# Patient Record
Sex: FEMALE | Race: BLACK OR AFRICAN AMERICAN | URBAN - METROPOLITAN AREA
[De-identification: names, ages, dates, MRNs, and addresses within clinical notes are randomized per-mention and may not be internally consistent; named-entity substitution may affect disease eponyms.]

---

## 2017-05-07 ENCOUNTER — HOSPITAL ENCOUNTER (EMERGENCY)
Facility: CLINIC | Age: 42
Discharge: HOME | End: 2017-05-07
Attending: EMERGENCY MEDICINE | Admitting: EMERGENCY MEDICINE
Payer: COMMERCIAL

## 2017-05-07 ENCOUNTER — APPOINTMENT (OUTPATIENT)
Dept: ULTRASOUND IMAGING | Facility: CLINIC | Age: 42
End: 2017-05-07
Attending: EMERGENCY MEDICINE
Payer: COMMERCIAL

## 2017-05-07 VITALS
SYSTOLIC BLOOD PRESSURE: 114 MMHG | DIASTOLIC BLOOD PRESSURE: 82 MMHG | TEMPERATURE: 97.8 F | HEART RATE: 68 BPM | OXYGEN SATURATION: 100 % | RESPIRATION RATE: 14 BRPM

## 2017-05-07 DIAGNOSIS — O03.9 FETAL DEMISE DUE TO MISCARRIAGE: ICD-10-CM

## 2017-05-07 LAB
ABO + RH BLD: NORMAL
ABO + RH BLD: NORMAL
B-HCG SERPL-ACNC: 6724 IU/L (ref 0–5)
HGB BLD-MCNC: 11.8 G/DL (ref 11.7–15.7)
SPECIMEN EXP DATE BLD: NORMAL

## 2017-05-07 PROCEDURE — 84702 CHORIONIC GONADOTROPIN TEST: CPT | Performed by: EMERGENCY MEDICINE

## 2017-05-07 PROCEDURE — 36415 COLL VENOUS BLD VENIPUNCTURE: CPT | Performed by: EMERGENCY MEDICINE

## 2017-05-07 PROCEDURE — 87591 N.GONORRHOEAE DNA AMP PROB: CPT | Performed by: EMERGENCY MEDICINE

## 2017-05-07 PROCEDURE — 87491 CHLMYD TRACH DNA AMP PROBE: CPT | Performed by: EMERGENCY MEDICINE

## 2017-05-07 PROCEDURE — 86901 BLOOD TYPING SEROLOGIC RH(D): CPT | Performed by: EMERGENCY MEDICINE

## 2017-05-07 PROCEDURE — 25000132 ZZH RX MED GY IP 250 OP 250 PS 637: Performed by: EMERGENCY MEDICINE

## 2017-05-07 PROCEDURE — 85018 HEMOGLOBIN: CPT | Performed by: EMERGENCY MEDICINE

## 2017-05-07 PROCEDURE — 99284 EMERGENCY DEPT VISIT MOD MDM: CPT

## 2017-05-07 PROCEDURE — 76801 OB US < 14 WKS SINGLE FETUS: CPT

## 2017-05-07 RX ORDER — IBUPROFEN 600 MG/1
600 TABLET, FILM COATED ORAL EVERY 6 HOURS PRN
Qty: 30 TABLET | Refills: 1 | Status: SHIPPED | OUTPATIENT
Start: 2017-05-07

## 2017-05-07 RX ORDER — HYDROCODONE BITARTRATE AND ACETAMINOPHEN 5; 325 MG/1; MG/1
1-2 TABLET ORAL EVERY 4 HOURS PRN
Qty: 20 TABLET | Refills: 0 | Status: SHIPPED | OUTPATIENT
Start: 2017-05-07

## 2017-05-07 RX ORDER — ACETAMINOPHEN 500 MG
500 TABLET ORAL EVERY 4 HOURS PRN
Status: DISCONTINUED | OUTPATIENT
Start: 2017-05-07 | End: 2017-05-07 | Stop reason: HOSPADM

## 2017-05-07 RX ADMIN — ACETAMINOPHEN 500 MG: 500 TABLET, FILM COATED ORAL at 18:24

## 2017-05-07 NOTE — ED PROVIDER NOTES
"  History     Chief Complaint:  Vaginal Bleeding    HPI   Alber Pacheco is a 41 year old female A2 who presents to the emergency department today for evaluation of vaginal bleeding. The patient is about 11 weeks pregnant and started having the vaginal bleeding yesterday with lower pelvic abdominal pain. She is having an associated headaches but no vomiting or diarrhea. In her past pregnancies she has had eclampsia.  Cramping but \"not as severe\" as past miscarriage.  Pain is now 3/10.  Not passing clots or tissue.    Allergies:  No Known Drug Allergies      Medications:    Ibuprofen    Past Medical History:    Arthritis   Pre eclampsia      Past Surgical History:    C section  Cholecystectomy     Family History:    Mother-hypertension, thyroid disease    Social History:  Smoking Status: never smoker  Alcohol Use: negative    Marital Status:   [2]    Review of Systems  All other ROS reviewed and negative.  Pt is 11 weeks pregnant and started having vaginal bleeding yesterday. Denies clots or need for a pad.    Physical Exam   Vitals:  Patient Vitals for the past 24 hrs:   BP Temp Temp src Pulse Resp SpO2   17 1536 117/72 97.8  F (36.6  C) Oral 67 14 100 %      Physical Exam  GEN: patient with no distress  HEAD: atraumatic, normocephalic  EYES: pupils reactive,  conjunctivae normal  ENT: TMs flat and white bilaterally, oropharynx normal with no erythema or exudate, mucus membranes moist  NECK: no cervical LAD  RESPIRATORY: no tachypnea, breath sounds clear to auscultation  CVS: normal S1/S2, no murmurs/rubs/gallops  ABDOMEN: soft, nontender, no masses or organomegaly, no rebound, positive bowel sounds  BACK: no CVAT  EXTREMITIES: intact pulses x 2 (radial pulses intact), no edema  SKIN: warm and dry  NEURO: Overall symmetrical exam  HEME: no bruising or petechiae/contusions  LYMPH: no lymphadenopathy  : normal female external genitalia, no adnexal masses, no vaginal discharge but slight old blood and " cervical bleeding, cervix is closed    Emergency Department Course       Imaging:  Radiology findings were communicated with the patient who voiced understanding of the findings.  US OB:   FINDINGS: There is an intrauterine pregnancy with crown-rump length of  1.7 cm consistent with 8 weeks 1 day mean gestational age. However  there is no heart motion indicating fetal demise. Gestational sac  appears normal. No evidence of hemorrhage.    Right ovary: Unremarkable.  Left ovary: Corpus luteum pregnancy about 1.2 cm diameter.  Adnexal mass: None.  Free pelvic fluid: None.             Fetal demise at 8 weeks 1 day gestational age.  Reading per radiology.     Laboratory:  HCG Quantitative: 6724 (H)  RH Type: O Positive  Hemoglobin: 11.8   Neisseria gonorrhea: Pending  Chlamydia trachomatis: Pending    Interventions:  Tylenol 500mg PO    Emergency Department Course:  Nursing notes and vitals reviewed.  I performed an exam of the patient as documented above.   IV was inserted and blood was drawn for laboratory testing, results above.  The patient was sent for a OB ultrasound while in the emergency department, results above.      I personally reviewed the laboratory and imaging results with the patient and answered all related questions prior to discharge.    7:56 PM Patient updated regarding results.  /82  Pulse 68  Temp 97.8  F (36.6  C) (Oral)  Resp 14  LMP 02/15/2017  SpO2 100%  Breastfeeding? No    Discussed results with patient.  Gave patient copies of results (applicable labs, CT scans and/or ultrasound).  Answered questions.  Asked patient to followup with PCP.    Impression & Plan      Medical Decision Making:  See dictation    Diagnosis:  Fetal demise  Miscarriage in process    Disposition:   Home    Instructions to patient:  The bloodwork and ultrasound show a baby is present, however the heartbeat of the baby is not detectable.  This means that you are undergoing a miscarriage.  You can expect more  bleeding and cramping as the uterine contents empty out.    Hydrate and push fluids.  Followup for a recheck with OBGYN in the next 2-3 days.    Motrin/ibuprofen for mild pain.    .orco for more severe pain.    Scribe Disclosure:  I, Martin Donte, am serving as a scribe at 4:09 PM on 5/7/2017 to document services personally performed by Eufemia Loving MD, based on my observations and the provider's statements to me.   5/7/2017   [facility] EMERGENCY DEPARTMENT       Eufemia Loving MD  05/07/17 5976

## 2017-05-07 NOTE — ED NOTES
In Triage: ABC's intact and interacting appropriately for situation.    Home meds: calcium    Pt is 11 weeks pregnant and started having vaginal bleeding yesterday. Denies clots or need for a pad.

## 2017-05-07 NOTE — ED AVS SNAPSHOT
[facility] Emergency Department    [address] [place] [address]    Phone:  [phone]    Fax:  [phone]                                       Alber Pacheco   MRN: [id number]    Department:  [facility] Emergency Department   Date of Visit:  5/7/2017           Patient Information     Date Of Birth          1975        Your diagnoses for this visit were:     Fetal demise due to miscarriage        You were seen by Eufemia Loving MD.      Follow-up Information     Follow up with [facility], [place], MD.    Specialty:  Family Practice    Contact information:    [facility] [facility]  [place] [address]  [phone]          Discharge Instructions         The bloodwork and ultrasound show a baby is present, however the heartbeat of the baby is not detectable.  This means that you are undergoing a miscarriage.  You can expect more bleeding and cramping as the uterine contents empty out.    Hydrate and push fluids.  Followup for a recheck with OBGYN in the next 2-3 days.    Motrin/ibuprofen for mild pain.    .Norco for more severe pain.    Discharge Instructions for Miscarriage  You have had a miscarriage. This is the unplanned end of a pregnancy before the baby is able to live outside the womb. You may have experienced a shock to your system, both physically and emotionally. Because of this, you may not feel well for a few days. Your body is going through changes, and you can expect mood swings. When you are ready, start back to your normal routine.  Home care    Return to work or your daily routines when you feel ready. This might be right away, or you may want to wait a few days.    Take showers instead of tub baths. This helps prevent infection. Ask your health care provider when you can take baths again.    Avoid strenuous exercise, such as aerobics or running, until the bleeding slows to the rate of a normal period.    Don t have sexual intercourse  or use tampons or douches until your doctor says it s OK.    Get emotional support. Ask your doctor about support groups in your area. Many women find it helpful to talk to other women who have had a miscarriage.  Follow-up  Make a follow-up appointment with your health care provider.  When to call your health care provider  Call your health care provider right away if you have any of the following:    Fever above 100.4 F (38 C) or chills    Bright red vaginal bleeding or a smelly discharge    Vaginal bleeding that soaks more than 1 menstrual pad per hour    Abdominal pain that is severe or getting worse     8445-9646 [employer] [address] [place] [address]. All rights reserved. This information is not intended as a substitute for professional medical care. Always follow your healthcare professional's instructions.          24 Hour Appointment Hotline       To make an appointment at any [facility], call [phone] ([phone]). If you don't have a family doctor or clinic, we will help you find one. [facility] clinics are conveniently located to serve the needs of you and your family.             Review of your medicines      START taking        Dose / Directions Last dose taken    HYDROcodone-acetaminophen 5-325 MG per tablet   Commonly known as:  NORCO   Dose:  1-2 tablet   Quantity:  20 tablet        Take 1-2 tablets by mouth every 4 hours as needed for moderate to severe pain   Refills:  0          Our records show that you are taking the medicines listed below. If these are incorrect, please call your family doctor or clinic.        Dose / Directions Last dose taken    Calcium Carb-Cholecalciferol 1000-800 MG-UNIT Tabs   Commonly known as:  CALCIUM 1000 + D   Dose:  1 tablet   Quantity:  100 tablet        Take 1 tablet by mouth daily TAKE WITH FOOD, FOR BONE HEALTH AND LOW VITAMIN D ([facility], [facility])   Refills:  PRN        Medical Compression Stockings Misc         Refills:  0          ASK your doctor about these medications        Dose / Directions Last dose taken    * IBUPROFEN PO   Dose:  1 tablet   What changed:  Another medication with the same name was added. Make sure you understand how and when to take each.   Ask about: Which instructions should I use?        Take 1 tablet by mouth as needed for moderate pain   Refills:  0        * ibuprofen 600 MG tablet   Commonly known as:  ADVIL/MOTRIN   Dose:  600 mg   What changed:  You were already taking a medication with the same name, and this prescription was added. Make sure you understand how and when to take each.   Quantity:  30 tablet   Ask about: Which instructions should I use?        Take 1 tablet (600 mg) by mouth every 6 hours as needed for moderate pain   Refills:  1        * Notice:  This list has 2 medication(s) that are the same as other medications prescribed for you. Read the directions carefully, and ask your doctor or other care provider to review them with you.            Prescriptions were sent or printed at these locations (2 Prescriptions)                   Other Prescriptions                Printed at Department/Unit printer (2 of 2)         HYDROcodone-acetaminophen (NORCO) 5-325 MG per tablet               ibuprofen (ADVIL/MOTRIN) 600 MG tablet                Procedures and tests performed during your visit     Chlamydia trachomatis PCR    HCG quantitative pregnancy    Hemoglobin    Neisseria gonorrhoeae PCR    Rh type    US OB < 14 Weeks w Transvaginal      Orders Needing Specimen Collection     None      Pending Results     Date and Time Order Name Status Description    5/7/2017 1623 Chlamydia trachomatis PCR In process     5/7/2017 1623 Neisseria gonorrhoeae PCR In process             Pending Culture Results     Date and Time Order Name Status Description    5/7/2017 1623 Chlamydia trachomatis PCR In process     5/7/2017 1623 Neisseria gonorrhoeae PCR In process             Pending Results  Instructions     If you had any lab results that were not finalized at the time of your Discharge, you can call the ED Lab Result RN at [phone]. You will be contacted by this team for any positive Lab results or changes in treatment. The nurses are available 7 days a week from 10A to 6:30P.  You can leave a message 24 hours per day and they will return your call.        Test Results From Your Hospital Stay        5/7/2017  5:27 PM      Component Results     Component Value Ref Range & Units Status    HCG Quantitative Serum 6724 (H) 0 - 5 IU/L Final    Specimen run with a dilution         5/7/2017  5:14 PM      Component Results     Component    ABO    O    RH(D)     Pos    Specimen Expires    05/10/2017         5/7/2017  5:09 PM      Component Results     Component Value Ref Range & Units Status    Hemoglobin 11.8 11.7 - 15.7 g/dL Final         5/7/2017  5:11 PM         5/7/2017  5:11 PM         5/7/2017  7:58 PM      Narrative     OBSTETRIC ULTRASOUND WITH ENDOVAGINAL TRANSDUCER    5/7/2017 7:06 PM     HISTORY: Bleeding and cramping.    TECHNIQUE:  Endovaginal sonography was added to the transabdominal  exam to better evaluate the uterus and ovaries because of inadequate  bladder fullness.    COMPARISON: None.    FINDINGS: There is an intrauterine pregnancy with crown-rump length of  1.7 cm consistent with 8 weeks 1 day mean gestational age. However  there is no heart motion indicating fetal demise. Gestational sac  appears normal. No evidence of hemorrhage.    Right ovary: Unremarkable.  Left ovary: Corpus luteum pregnancy about 1.2 cm diameter.  Adnexal mass: None.  Free pelvic fluid: None.        Impression     IMPRESSION: Fetal demise at 8 weeks 1 day gestational age.      MAEVE RODRIGUEZ MD                Clinical Quality Measure: Blood Pressure Screening     Your blood pressure was checked while you were in the emergency department today. The last reading we obtained was  BP: 117/72 . Please read the  "guidelines below about what these numbers mean and what you should do about them.  If your systolic blood pressure (the top number) is less than 120 and your diastolic blood pressure (the bottom number) is less than 80, then your blood pressure is normal. There is nothing more that you need to do about it.  If your systolic blood pressure (the top number) is 120-139 or your diastolic blood pressure (the bottom number) is 80-89, your blood pressure may be higher than it should be. You should have your blood pressure rechecked within a year by a primary care provider.  If your systolic blood pressure (the top number) is 140 or greater or your diastolic blood pressure (the bottom number) is 90 or greater, you may have high blood pressure. High blood pressure is treatable, but if left untreated over time it can put you at risk for heart attack, stroke, or kidney failure. You should have your blood pressure rechecked by a primary care provider within the next 4 weeks.  If your provider in the emergency department today gave you specific instructions to follow-up with your doctor or provider even sooner than that, you should follow that instruction and not wait for up to 4 weeks for your follow-up visit.        Thank you for choosing [facility]       Thank you for choosing [facility] for your care. Our goal is always to provide you with excellent care. Hearing back from our patients is one way we can continue to improve our services. Please take a few minutes to complete the written survey that you may receive in the mail after you visit with us. Thank you!        [employer] Information     [employer] lets you send messages to your doctor, view your test results, renew your prescriptions, schedule appointments and more. To sign up, go to [email or web address] . Click on \"Log in\" on the left side of the screen, which will take you to the Welcome page. Then click on \"Sign up Now\" on the right side of the page.     You will be " asked to enter the access code listed below, as well as some personal information. Please follow the directions to create your username and password.     Your access code is: [email or web address]  Expires: 2017  7:57 PM     Your access code will  in 90 days. If you need help or a new code, please call your [facility] clinic or [phone].        Care EveryWhere ID     This is your Care EveryWhere ID. This could be used by other organizations to access your [facility] medical records  [id number]        After Visit Summary       This is your record. Keep this with you and show to your community pharmacist(s) and doctor(s) at your next visit.

## 2017-05-07 NOTE — ED AVS SNAPSHOT
[facility] Emergency Department    [address]    [facility] [address]    Phone:  [phone]    Fax:  [phone]                                       Alber Pacheco   MRN: [id number]    Department:  [facility] Emergency Department   Date of Visit:  5/7/2017           After Visit Summary Signature Page     I have received my discharge instructions, and my questions have been answered. I have discussed any challenges I see with this plan with the nurse or doctor.    ..........................................................................................................................................  Patient/Patient Representative Signature      ..........................................................................................................................................  Patient Representative Print Name and Relationship to Patient    ..................................................               ................................................  Date                                            Time    ..........................................................................................................................................  Reviewed by Signature/Title    ...................................................              ..............................................  Date                                                            Time

## 2017-05-08 LAB
C TRACH DNA SPEC QL NAA+PROBE: NORMAL
N GONORRHOEA DNA SPEC QL NAA+PROBE: NORMAL
SPECIMEN SOURCE: NORMAL
SPECIMEN SOURCE: NORMAL

## 2017-05-08 NOTE — DISCHARGE INSTRUCTIONS
The bloodwork and ultrasound show a baby is present, however the heartbeat of the baby is not detectable.  This means that you are undergoing a miscarriage.  You can expect more bleeding and cramping as the uterine contents empty out.    Hydrate and push fluids.  Followup for a recheck with OBGYN in the next 2-3 days.    Motrin/ibuprofen for mild pain.    .Norco for more severe pain.    Discharge Instructions for Miscarriage  You have had a miscarriage. This is the unplanned end of a pregnancy before the baby is able to live outside the womb. You may have experienced a shock to your system, both physically and emotionally. Because of this, you may not feel well for a few days. Your body is going through changes, and you can expect mood swings. When you are ready, start back to your normal routine.  Home care    Return to work or your daily routines when you feel ready. This might be right away, or you may want to wait a few days.    Take showers instead of tub baths. This helps prevent infection. Ask your health care provider when you can take baths again.    Avoid strenuous exercise, such as aerobics or running, until the bleeding slows to the rate of a normal period.    Don t have sexual intercourse or use tampons or douches until your doctor says it s OK.    Get emotional support. Ask your doctor about support groups in your area. Many women find it helpful to talk to other women who have had a miscarriage.  Follow-up  Make a follow-up appointment with your health care provider.  When to call your health care provider  Call your health care provider right away if you have any of the following:    Fever above 100.4 F (38 C) or chills    Bright red vaginal bleeding or a smelly discharge    Vaginal bleeding that soaks more than 1 menstrual pad per hour    Abdominal pain that is severe or getting worse     [id number] [employer]. [address] [place] [address], [place] [address]. All rights reserved. This  information is not intended as a substitute for professional medical care. Always follow your healthcare professional's instructions.

## 2021-09-03 ENCOUNTER — LAB (OUTPATIENT)
Dept: LAB | Facility: CLINIC | Age: 46
End: 2021-09-03
Payer: COMMERCIAL

## 2021-09-03 ENCOUNTER — OFFICE VISIT (OUTPATIENT)
Dept: OBGYN | Facility: CLINIC | Age: 46
End: 2021-09-03
Payer: COMMERCIAL

## 2021-09-03 VITALS
BODY MASS INDEX: 29.57 KG/M2 | SYSTOLIC BLOOD PRESSURE: 110 MMHG | WEIGHT: 184 LBS | DIASTOLIC BLOOD PRESSURE: 74 MMHG | HEIGHT: 66 IN | HEART RATE: 59 BPM

## 2021-09-03 DIAGNOSIS — N92.6 ABNORMAL BLEEDING IN MENSTRUAL CYCLE: Primary | ICD-10-CM

## 2021-09-03 DIAGNOSIS — N92.6 ABNORMAL BLEEDING IN MENSTRUAL CYCLE: ICD-10-CM

## 2021-09-03 LAB
HGB BLD-MCNC: 11.7 G/DL (ref 11.7–15.7)
PROLACTIN SERPL-MCNC: 5 UG/L (ref 3–27)
TSH SERPL DL<=0.005 MIU/L-ACNC: 1.01 MU/L (ref 0.4–4)

## 2021-09-03 PROCEDURE — 84146 ASSAY OF PROLACTIN: CPT

## 2021-09-03 PROCEDURE — 84443 ASSAY THYROID STIM HORMONE: CPT

## 2021-09-03 PROCEDURE — 85018 HEMOGLOBIN: CPT

## 2021-09-03 PROCEDURE — 99203 OFFICE O/P NEW LOW 30 MIN: CPT | Mod: GE | Performed by: OBSTETRICS & GYNECOLOGY

## 2021-09-03 PROCEDURE — 36415 COLL VENOUS BLD VENIPUNCTURE: CPT

## 2021-09-03 PROCEDURE — G0463 HOSPITAL OUTPT CLINIC VISIT: HCPCS

## 2021-09-03 ASSESSMENT — MIFFLIN-ST. JEOR: SCORE: 1496.37

## 2021-09-03 ASSESSMENT — PAIN SCALES - GENERAL: PAINLEVEL: NO PAIN (0)

## 2021-09-03 NOTE — PROGRESS NOTES
"Gynecology Consult Note    Referring Physician: patient  Reason for Consult: fibroids    HPI: Alber Pacheco is a 45 year old  referred to clinic for uterine fibroids. Patient has concerns about her irregular periods. For the last year she has had either two periods per month, lasting 6 days with lots of heavy bleeding or she skips every 2-3 months. She had been seen by a OB provider and was told she had a fibroid.   She occasionally feels lightheaded. She is also interested in fertiliy. She has had two prior miscarriages , . She has regular intercourse  She denies heat/cold intolerance, frequent headaches, abnormal hair growth or acne, weight changes.     GynHx:   Last pap- unkown, normal  Abnormal paps- none  STIs- none  Contraception Hx: None    PMH:   Past Medical History:   Diagnosis Date     Arthritis        PSH:   Past Surgical History:   Procedure Laterality Date     C/SECTION, LOW TRANSVERSE       CHOLECYSTECTOMY, LAPOROSCOPIC         Social Hx:   Social History     Tobacco Use     Smoking status: Never Smoker     Smokeless tobacco: Never Used   Substance Use Topics     Alcohol use: No     Drug use: No        Family History: family history includes Hypertension in her mother; Thyroid Disease in her mother.  No family history of breast, ovarian or uterine cancer. No history of DVT or migranes.    ROS:   Negative except per HPI    Objective:   /74   Pulse 59   Ht 1.676 m (5' 6\")   Wt 83.5 kg (184 lb)   LMP 2021   Breastfeeding No   BMI 29.70 kg/m    Constitutional: Healthy appearing  female, no acute distress  HEENT: Normal appearance.  Neck supple, thyroid normal in size without nodularity or masses.  Cardiovascular: Regular rate and rhythm   Respiratory: Breathing easily on RA  Skin: No suspicious lesions or rashes, no hyperpigmentation or acne  Psychiatric: mentation appears normal and affect normal/bright    Labs/Imaging:  Pelvic US 21  The uterus is present, " retroverted, without deviation, and measures 6.9 x   5.1 x 4.3 cm.  The myometrium is heterogeneous.  The endometrial thickness is 10.0 mm. Endometrium is heterogenous and   ill-defined.  One submucosal myoma is located in the posterior and lower segment of the   uterus. It measures 1.1 x 1.1 x 0.9 cm and does appear to encroach upon   but not distort the endometrial lining.  The right ovary measures 3.2 X 2.0 X 1.0 cm and is normal in appearance.  The left ovary measures 3.0 X 1.6 X 1.5 cm and is normal in appearance.   Dominant follicle noted.  Free fluid in the cul de sac: None    Assessment/Plan:   Alber Pacheco is a 45 year old  with AUB of unknown origin. Reviewed today that there are lots of reasons for abnormal bleeding including hormonal and structural causes. Reviewed thyroid, prolactinoma, perimenopause, uterine polyp and fibroid. On her most recent US 4 months ago her EMS was irregular concerning for possible polyp. There was also a small fibroid but as it is 1 cm, do not expect that this is the cause of her heavy, irregular bleeding. Reviewed that polyp may be a partial cause but will also perform  TSH and prolactin today to rule out other causes. No evidence of PCOS on previous US or findings of hyperandrogenism. She is interested in a repeat US to determine if the polyp is still present. Reviewed that treatment for polyp would be hysteroscopy with polypectomy. If polyp is not present would still recommend endometrial sampling to rule out endometrial hyperplasia/malignancy.   Also briefly reviewed with patient that with her age there is likely a component of perimenopause as well. As she has not conceived in > 5 years despite trying she meets the criteria for infertility. Reviewed process of decrease egg quality and quantity with age. Would still recommend removing polyp if present if she wants to continue to pursue fertility. Recommended follow up for further discussion of infertility after  resolution of AUB work up.  - Labs: TSH, prolactin, Hg  - Next visit for US followed by MD visit to discuss findings/plan      Discussed with Dr. Donna Toth MD  Obstetrics & Gynecology, PGY-4  9/3/2021 5:05 PM      Patient was seen by the resident in [facility].  I reviewed the history & exam.  The patient's assessment and plan were made jointly.    Kelly Thompson MD MPH

## 2021-09-03 NOTE — NURSING NOTE
Chief Complaint   Patient presents with     Establish Care     Fibroid consult   Shereen Viramontes LPN

## 2021-09-13 ENCOUNTER — ANCILLARY PROCEDURE (OUTPATIENT)
Dept: ULTRASOUND IMAGING | Facility: CLINIC | Age: 46
End: 2021-09-13
Attending: OBSTETRICS & GYNECOLOGY
Payer: COMMERCIAL

## 2021-09-13 DIAGNOSIS — N92.6 ABNORMAL BLEEDING IN MENSTRUAL CYCLE: ICD-10-CM

## 2021-09-13 PROCEDURE — 76830 TRANSVAGINAL US NON-OB: CPT | Mod: 26 | Performed by: OBSTETRICS & GYNECOLOGY

## 2021-09-13 PROCEDURE — 76830 TRANSVAGINAL US NON-OB: CPT

## 2021-09-30 ENCOUNTER — OFFICE VISIT (OUTPATIENT)
Dept: OBGYN | Facility: CLINIC | Age: 46
End: 2021-09-30
Attending: OBSTETRICS & GYNECOLOGY
Payer: COMMERCIAL

## 2021-09-30 VITALS
HEIGHT: 66 IN | DIASTOLIC BLOOD PRESSURE: 79 MMHG | WEIGHT: 185.9 LBS | HEART RATE: 64 BPM | BODY MASS INDEX: 29.88 KG/M2 | SYSTOLIC BLOOD PRESSURE: 115 MMHG

## 2021-09-30 DIAGNOSIS — Z31.41 FERTILITY TESTING: ICD-10-CM

## 2021-09-30 DIAGNOSIS — N93.9 ABNORMAL UTERINE BLEEDING (AUB): Primary | ICD-10-CM

## 2021-09-30 PROCEDURE — 99212 OFFICE O/P EST SF 10 MIN: CPT | Performed by: OBSTETRICS & GYNECOLOGY

## 2021-09-30 PROCEDURE — G0463 HOSPITAL OUTPT CLINIC VISIT: HCPCS

## 2021-09-30 ASSESSMENT — MIFFLIN-ST. JEOR: SCORE: 1504.99

## 2021-09-30 NOTE — PROGRESS NOTES
"S:  Pt is a 44 y/o  who presents today to follow up perimenopausal irregular bleeding and secondary infertility.  She was seen by Dr. Toth on 9/3 with similar concerns.  She had an ultrasound in 4/21 that was concerning for a possible endometrial polyp and a follow up study was ordered.  Today is asks about the fibroid that was seen previously and notes prior evaluation through an [facility] (a procedure that \"flushed the uterus\") as well as a prior 3 month trial of OCPs.  She notes normal withdrawal bleeds on OCPs but that her irregular bleeding returned after she stopped them.     O: /79 (BP Location: Left arm, Patient Position: Chair)   Pulse 64   Ht 1.676 m (5' 6\")   Wt 84.3 kg (185 lb 14.4 oz)   LMP 09/02/2021   BMI 30.01 kg/m    gen-pleasant, NAD    Ultrasound today-  45 year old female presents for gynecologic ultrasound indicated by Abnormal bleeding in menstrual cycle.  This study was done transvaginally.     Uterine findings:              Presence: Visible Size: Normal 7.0 x 4.8 x 4.2 cm.  Endometrium = 0.9 mm.              Cx length = 37.7 mm.                            Flexion:  Retroverted  Position: Midline          Margins: Smooth         Shape: Normal              Contour: Regular        Texture: bulky posterior uterus           Cavity: Normal            Masses: Normal     Pelvic findings:               Right Adnexa: Normal              Left Adnexa: Normal              Bladder:  Normal                                                           Cul - de - sac fluid: none     Ovarian follicles:              Right ovary:  3.3 x 5.2 x 1.0 cm.                 Left ovary:  3.1 x 2.2 x 1.0 cm.        Comments:  Normal appearing uterus and endometrial stripe.  Bulky area noted in posterior uterus of uncertain etiology.  Further studies as clinically indicated.      DAVE Elias MD, FACOG        Chart reviewed-HSG in 5/20 with possible 8mm endometrial polyp     " CT venogram of pelvis 9/20 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  She has a known small fibroid that does not impinge on the endometrium, normal appearing endometrial stripe today without evidence of polyp.      P:  Reviewed her results today-she was mainly worried about her fibroid.  Recommended an EMB given her age and ongoing bleeding-she states that she will make a return visit for that.  Discussed options for cycle control but that anything we do will give her contraception. Offered a referral to and [facility] so that she can at least have information on what her fertility options would be (likely only IVF with donor eggs).  She would like to do this-the information for [facility] was given, referral order placed.     Chey Em MD, FACOG